# Patient Record
Sex: FEMALE | Race: BLACK OR AFRICAN AMERICAN | Employment: UNEMPLOYED | URBAN - METROPOLITAN AREA
[De-identification: names, ages, dates, MRNs, and addresses within clinical notes are randomized per-mention and may not be internally consistent; named-entity substitution may affect disease eponyms.]

---

## 2023-01-15 ENCOUNTER — APPOINTMENT (OUTPATIENT)
Dept: GENERAL RADIOLOGY | Facility: CLINIC | Age: 33
End: 2023-01-15
Payer: MEDICARE

## 2023-01-15 ENCOUNTER — HOSPITAL ENCOUNTER (EMERGENCY)
Facility: CLINIC | Age: 33
Discharge: HOME | End: 2023-01-15
Attending: EMERGENCY MEDICINE
Payer: MEDICARE

## 2023-01-15 VITALS
WEIGHT: 136 LBS | BODY MASS INDEX: 24.1 KG/M2 | TEMPERATURE: 97.9 F | HEART RATE: 60 BPM | RESPIRATION RATE: 17 BRPM | DIASTOLIC BLOOD PRESSURE: 68 MMHG | OXYGEN SATURATION: 100 % | HEIGHT: 63 IN | SYSTOLIC BLOOD PRESSURE: 116 MMHG

## 2023-01-15 DIAGNOSIS — M67.431 GANGLION CYST OF DORSUM OF RIGHT WRIST: Primary | ICD-10-CM

## 2023-01-15 PROCEDURE — 99284 EMERGENCY DEPT VISIT MOD MDM: CPT

## 2023-01-15 PROCEDURE — 6360000002 HC RX W HCPCS: Performed by: REGISTERED NURSE

## 2023-01-15 PROCEDURE — 73130 X-RAY EXAM OF HAND: CPT

## 2023-01-15 PROCEDURE — 96372 THER/PROPH/DIAG INJ SC/IM: CPT

## 2023-01-15 PROCEDURE — 73110 X-RAY EXAM OF WRIST: CPT

## 2023-01-15 RX ORDER — METHYLPREDNISOLONE 4 MG/1
TABLET ORAL
Qty: 1 KIT | Refills: 0 | Status: SHIPPED | OUTPATIENT
Start: 2023-01-15 | End: 2023-01-21

## 2023-01-15 RX ORDER — KETOROLAC TROMETHAMINE 30 MG/ML
30 INJECTION, SOLUTION INTRAMUSCULAR; INTRAVENOUS ONCE
Status: COMPLETED | OUTPATIENT
Start: 2023-01-15 | End: 2023-01-15

## 2023-01-15 RX ADMIN — KETOROLAC TROMETHAMINE 30 MG: 30 INJECTION, SOLUTION INTRAMUSCULAR; INTRAVENOUS at 14:55

## 2023-01-15 ASSESSMENT — PAIN - FUNCTIONAL ASSESSMENT: PAIN_FUNCTIONAL_ASSESSMENT: 0-10

## 2023-01-15 ASSESSMENT — PAIN SCALES - GENERAL: PAINLEVEL_OUTOF10: 10

## 2023-01-15 ASSESSMENT — ENCOUNTER SYMPTOMS
BACK PAIN: 0
COLOR CHANGE: 0

## 2023-01-15 NOTE — DISCHARGE INSTRUCTIONS
Please understand that at this time there is no evidence for a more serious underlying process, but that early in the process of an illness or injury, an emergency department workup can be falsely reassuring. You should contact your family doctor within the next 48 hours for a follow up appointment    [place]!!!    From [facility] and [facility] Emergency Services    On behalf of the Emergency Department staff at [facility], I would like to thank you for giving us the opportunity to address your health care needs and concerns. We hope that during your visit, our service was delivered in a professional and caring manner. Please keep [facility] in mind as we walk with you down the path to your own personal wellness. Please expect an automated text message or email from us so we can ask a few questions about your health and progress. Based on your answers, a clinician may call you back to offer help and instructions. Please understand that early in the process of an illness or injury, an emergency department workup can be falsely reassuring. If you notice any worsening, changing or persistent symptoms please call your family doctor or return to the ER immediately. Tell us how we did during your visit at [email or web address]   and let us know about your experience

## 2023-01-15 NOTE — ED PROVIDER NOTES
[facility]  eMERGENCY dEPARTMENT eNCOUnter   Independent Attestation     Pt Name: Karla Salas  MRN: [id number]  Birthdate 1990  Date of evaluation: 1/15/23       Karla Salas is a 32 y.o. female who presents with Hand Pain (Right hand pain w/ swelling x1 week. Pt states getting worse)        Based on the medical record, the care appears appropriate. I was personally available for consultation in the Emergency Department.    Sharlene Baez DO  Attending Emergency  Physician               Sharlene Baez DO  01/15/23 1517

## 2023-01-15 NOTE — ED PROVIDER NOTES
[facility]  [address] [facility]  Phone: [phone]        Pt Name: Giles Nguyen  MRN: [id number]  [place] 1990  Date of evaluation: 1/15/23    CHIEFCOMPLAINT       Chief Complaint   Patient presents with    Hand Pain     Right hand pain w/ swelling x1 week. Pt states getting worse       HISTORY OF PRESENT ILLNESS (Location/Symptom, Timing/Onset, Context/Setting, Quality, Duration, Modifying Factors, Severity)      Giles Nguyen is a 34 y.o. female with no pertinent PMH who presents to the ED via private auto with right hand and wrist pain ongoing for the last week. Patient denies any known injury but states that she has had increased swelling to the right wrist.  She denies any numbness or tingling, fevers or chills. He has not take any medications for symptoms. She states that nothing makes her symptoms better and that movement does make her symptoms worse. PAST MEDICAL / SURGICAL / SOCIAL / FAMILY HISTORY     PMH:  has no past medical history on file. Surgical History:  has no past surgical history on file. Social History:  reports that she has never smoked. She has never used smokeless tobacco. She reports current alcohol use. She reports that she does not use drugs. Family History: has no family status information on file. family history is not on file. Psychiatric History: None    Allergies: Patient has no known allergies. Home Medications:   Prior to Admission medications    Medication Sig Start Date End Date Taking? Authorizing Provider   methylPREDNISolone (MEDROL, MARISOL,) 4 MG tablet Take by mouth per instructions on packaging.  1/15/23 1/21/23 Yes ASHLEE Bowman - LASHAE   norethindrone-ethinyl estradiol (JUNEL FE 1/20) 1-20 MG-MCG per tablet Take 1 tablet by mouth daily    Historical Provider, MD   oxyCODONE-acetaminophen (PERCOCET) 5-325 MG per tablet Take 1-2 tablets by mouth every 6 hours as needed for Pain 10/9/16   Hina Guerra MD Multiple Vitamins-Minerals (THERAPEUTIC MULTIVITAMIN-MINERALS) tablet Take 1 tablet by mouth daily. Historical Provider, MD       REVIEW OF SYSTEMS  (2-9 systems for level 4, 10 ormore for level 5)      Review of Systems   Constitutional:  Negative for chills and fever. Musculoskeletal:  Positive for joint swelling. Negative for back pain, neck pain and neck stiffness. Positive right wrist/hand pain   Skin:  Negative for color change, pallor and wound. Neurological:  Negative for weakness and numbness. All other systems negative except as marked. PHYSICAL EXAM  (up to 7 for level 4, 8 or more for level 5)      INITIAL VITALS:  height is 5' 3\" (1.6 m) and weight is 61.7 kg (136 lb). Her temperature is 97.9 °F (36.6 °C). Her blood pressure is 116/68 and her pulse is 60. Her respiration is 17 and oxygen saturation is 100%. Vital signs reviewed. Physical Exam  Constitutional:       General: She is not in acute distress. Appearance: Normal appearance. She is not ill-appearing or toxic-appearing. HENT:      Head: Normocephalic and atraumatic. Neck:      Trachea: No tracheal deviation. Musculoskeletal:      Cervical back: Neck supple. Comments: Swelling noted to the dorsum of right wrist with a palpable mobile cystlike mass noted to dorsum of right wrist.  Patient does have full range of motion. No snuffbox tenderness. Patients is neurovascularly intact. Able to give OK sign, thumbs, up, and spread fingers against resistance bilaterally. Sensation intact to palmar aspect of index finger, webbing between fingers, and pinky finger bilaterally. Bilateral radial pulses 2+   Skin:     General: Skin is warm and dry. Capillary Refill: Capillary refill takes less than 2 seconds. Neurological:      Mental Status: She is alert.    Psychiatric:         Mood and Affect: Mood normal.         Behavior: Behavior normal.         DIFFERENTIAL DIAGNOSIS / MDM     Plan obtain x-ray right wrist and hand to rule out any bony abnormality associated patient swelling. We will provide patient with IM Toradol for pain control. X-ray right hand shows mild soft tissue swelling with no acute osseous abnormality. X-ray right wrist shows mild dorsal soft tissue swelling with no acute osseous abnormality, there is a few scattered subchondral cysts present. I plan to discharge patient home to follow-up with PCP within 1 day and will refer patient to hand surgery. Instructed patient take Tylenol ibuprofen as needed for pain and ice for the long time at a time. We will also provide patient with steroid pack. We will provide patient with wrist brace for comfort. Directed return with any worsening pain, swelling, numbness tingling or inability use arm. Patient is agreement to this plan this time. All question concerns were answered at this time. At this time the patient is without objective evidence of an acute process requiring hospitalization or inpatient management. They have remained hemodynamically stable throughout their entire ED visit and are stable for discharge with outpatient follow-up. The patient understands that at this time there is no evidence for a more malignant underlying process, but the patient also understands that early in the process of an illness or injury, an emergency department workup can be falsely reassuring. Routine discharge counseling was given, and the patient understands that worsening, changing or persistent symptoms should prompt an immediate call or follow up with their primary physician or return to the emergency department. The importance of appropriate follow up was also discussed. I have reviewed the disposition diagnosis with the patient and or their family/guardian. I have answered their questions and given discharge instructions. They voiced understanding of these instructions and did not have any further questions or complaints.       PLAN (LABS / Ash [facility]lvarezrra / EKG):  Orders Placed This Encounter   Procedures    XR HAND RIGHT (MIN 3 VIEWS)    XR WRIST RIGHT (MIN 3 VIEWS)    [employer] Wrist Brace, Right       MEDICATIONS ORDERED:  Orders Placed This Encounter   Medications    ketorolac (TORADOL) injection 30 mg    methylPREDNISolone (MEDROL, MARISOL,) 4 MG tablet     Sig: Take by mouth per instructions on packaging. Dispense:  1 kit     Refill:  0       Controlled Substances Monitoring:     DIAGNOSTIC RESULTS     EKG: All EKG's are interpreted by the Emergency Department Physician who either signs or Co-signs this chart in the absenceof a cardiologist.        RADIOLOGY: All images are read by the radiologist and their interpretations are reviewed. XR HAND RIGHT (MIN 3 VIEWS)   Final Result   Right hand: Mild soft tissue swelling. No acute osseous abnormality. Right wrist: Mild dorsal soft tissue swelling. No acute osseous abnormality. A few scattered subchondral cysts are present. XR WRIST RIGHT (MIN 3 VIEWS)   Final Result   Right hand: Mild soft tissue swelling. No acute osseous abnormality. Right wrist: Mild dorsal soft tissue swelling. No acute osseous abnormality. A few scattered subchondral cysts are present. No results found. LABS:  No results found for this visit on 01/15/23. EMERGENCY DEPARTMENT COURSE           Vitals:    Vitals:    01/15/23 1430   BP: 116/68   Pulse: 60   Resp: 17   Temp: 97.9 °F (36.6 °C)   SpO2: 100%   Weight: 61.7 kg (136 lb)   Height: 5' 3\" (1.6 m)     -------------------------  BP: 116/68, Temp: 97.9 °F (36.6 °C), Heart Rate: 60, Resp: 17      RE-EVALUATION:  See ED Course notes above. CONSULTS:  None    PROCEDURES:  None    FINAL IMPRESSION      1. Ganglion cyst of dorsum of right wrist          DISPOSITION / PLAN     CONDITION ON DISPOSITION:   Stable for discharge.      PATIENT REFERRED TO:  Jamila Liz [address] [facility]  [phone]    Call in 1 day      [facility]  [address] [facility] [address] [phone]    If symptoms worsen    Linda Orellana MD  [address] [facility]   [address] [facility] [address] [phone]    Call in 1 day  Ganglion cyst    DISCHARGE MEDICATIONS:  Discharge Medication List as of 1/15/2023  3:15 PM        START taking these medications    Details   methylPREDNISolone (MEDROL, MARISOL,) 4 MG tablet Take by mouth per instructions on packaging., Disp-1 kit, R-0Normal             ASHLEE Orellana - CNP   Emergency Medicine Nurse Practitioner    (Please note that portions of this note were completed with a voice recognition program.  Efforts were made to edit the dictations but occasionally words aremis-transcribed.)       ASHLEE Orellana CNP  01/15/23 8269

## 2023-01-23 ENCOUNTER — HOSPITAL ENCOUNTER (EMERGENCY)
Facility: CLINIC | Age: 33
Discharge: HOME | End: 2023-01-23
Attending: EMERGENCY MEDICINE
Payer: MEDICARE

## 2023-01-23 VITALS
OXYGEN SATURATION: 100 % | RESPIRATION RATE: 18 BRPM | SYSTOLIC BLOOD PRESSURE: 140 MMHG | HEIGHT: 63 IN | HEART RATE: 82 BPM | TEMPERATURE: 98.3 F | DIASTOLIC BLOOD PRESSURE: 99 MMHG | WEIGHT: 137 LBS | BODY MASS INDEX: 24.27 KG/M2

## 2023-01-23 DIAGNOSIS — Z98.82 HISTORY OF BREAST AUGMENTATION: ICD-10-CM

## 2023-01-23 DIAGNOSIS — N63.0 BREAST SWELLING: Primary | ICD-10-CM

## 2023-01-23 LAB
ABSOLUTE EOS #: 0.1 K/UL (ref 0–0.4)
ABSOLUTE LYMPH #: 1.6 K/UL (ref 1–4.8)
ABSOLUTE MONO #: 0.6 K/UL (ref 0.1–1.2)
BASOPHILS # BLD: 1 % (ref 0–2)
BASOPHILS ABSOLUTE: 0 K/UL (ref 0–0.2)
EOSINOPHILS RELATIVE PERCENT: 1 % (ref 1–4)
HCT VFR BLD CALC: 41.7 % (ref 36–46)
HEMOGLOBIN: 13.9 G/DL (ref 12–16)
LYMPHOCYTES # BLD: 26 % (ref 24–44)
MCH RBC QN AUTO: 30.8 PG (ref 26–34)
MCHC RBC AUTO-ENTMCNC: 33.3 G/DL (ref 31–37)
MCV RBC AUTO: 92.7 FL (ref 80–100)
MONOCYTES # BLD: 10 % (ref 2–11)
PDW BLD-RTO: 12 % (ref 12.5–15.4)
PLATELET # BLD: 310 K/UL (ref 140–450)
PMV BLD AUTO: 7.1 FL (ref 6–12)
RBC # BLD: 4.5 M/UL (ref 4–5.2)
SEG NEUTROPHILS: 62 % (ref 36–66)
SEGMENTED NEUTROPHILS ABSOLUTE COUNT: 3.9 K/UL (ref 1.8–7.7)
WBC # BLD: 6.3 K/UL (ref 3.5–11)

## 2023-01-23 PROCEDURE — 99283 EMERGENCY DEPT VISIT LOW MDM: CPT

## 2023-01-23 PROCEDURE — 85025 COMPLETE CBC W/AUTO DIFF WBC: CPT

## 2023-01-23 ASSESSMENT — PAIN - FUNCTIONAL ASSESSMENT: PAIN_FUNCTIONAL_ASSESSMENT: NONE - DENIES PAIN

## 2023-01-23 NOTE — DISCHARGE INSTRUCTIONS
MRI performed as soon as possible. Contact your plastic surgeon about following up. Return for worsening pain, swelling, redness, fever, drainage, or if worse in any way. Please understand that at this time there is no evidence for a more serious underlying process, but that early in the process of an illness or injury, an emergency department workup can be falsely reassuring. You should contact your family doctor within the next 48 hours for a follow up appointment    Cadendianejoaquin Marilee!!!    From [facility]) and [facility] Emergency Services    On behalf of the Emergency Department staff at [facility]), I would like to thank you for giving us the opportunity to address your health care needs and concerns. We hope that during your visit, our service was delivered in a professional and caring manner. Please keep [facility] ([facility]) in mind as we walk with you down the path to your own personal wellness. Please expect an automated text message or email from us so we can ask a few questions about your health and progress. Based on your answers, a clinician may call you back to offer help and instructions. Please understand that early in the process of an illness or injury, an emergency department workup can be falsely reassuring. If you notice any worsening, changing or persistent symptoms please call your family doctor or return to the ER immediately. Tell us how we did during your visit at [email or web address]   and let us know about your experience

## 2023-01-23 NOTE — ED PROVIDER NOTES
[address] ENCOUNTER      Pt Name: Neymar Duff  MRN: [id number]  [place] 1990  Date of evaluation: 2023      CHIEF COMPLAINT       Chief Complaint   Patient presents with    Breast Problem         HISTORY OF PRESENT ILLNESS      The patient presents with swelling of the right breast.  She had implants placed 6 months ago. She says that a week ago she started noticing swelling around the breast especially in the superior aspect. It is not tender. She has not had felt warmth. She has not had drainage. She has not had fever. Her surgery was performed in [place]. She has not seen anybody locally. REVIEW OF SYSTEMS       All systems reviewed and negative unless noted in HPI. The patient denies fever or constitutional symptoms. Denies chest pain or shortness of breath. No nausea,  vomiting or diarrhea. Denies musculoskeletal injury or pain. Denies any weakness, numbness or focal neurologic deficit. Swelling around right breast as noted in HPI. No recent psychiatric issues. No easy bruising or bleeding. Denies any polyuria, polydypsia or history of immunocompromise. PAST MEDICAL HISTORY    has a past medical history of Delivery with history of  and History of bilateral breast implants. SURGICAL HISTORY      has a past surgical history that includes fracture surgery. CURRENT MEDICATIONS       Previous Medications    MULTIPLE VITAMINS-MINERALS (THERAPEUTIC MULTIVITAMIN-MINERALS) TABLET    Take 1 tablet by mouth daily. NORETHINDRONE-ETHINYL ESTRADIOL (JUNEL FE 1/20) 1-20 MG-MCG PER TABLET    Take 1 tablet by mouth daily    OXYCODONE-ACETAMINOPHEN (PERCOCET) 5-325 MG PER TABLET    Take 1-2 tablets by mouth every 6 hours as needed for Pain       ALLERGIES     has No Known Allergies. FAMILY HISTORY     has no family status information on file. family history is not on file.     SOCIAL HISTORY reports that she has never smoked. She has never used smokeless tobacco. She reports current alcohol use. She reports that she does not use drugs. PHYSICAL EXAM     INITIAL VITALS:  height is 5' 3\" (1.6 m) and weight is 62.1 kg (137 lb). Her temperature is 98.3 °F (36.8 °C). Her blood pressure is 140/99 (abnormal) and her pulse is 82. Her respiration is 18 and oxygen saturation is 100%. The patient is alert and oriented, in no apparent distress. HEENT is atraumatic. Pupils are PERRL at 4 mm. Mucous membranes moist.    Neck is supple. Heart sounds regular rate and rhythm with no gallops, murmurs, or rubs. Lungs clear, no wheezes, rales or rhonchi. Abdomen: soft, nontender with no pain to palpation. Musculoskeletal exam shows no evidence of trauma. Normal distal pulses in all extremities. Skin: Examination of right breast demonstrates swelling especially superior to the breast up towards the top of the chest wall. No tenderness noted. No redness or warmth noted. No drainage from the nipple. Neurological exam reveals cranial nerves 2 through 12 grossly intact. Patient has equal  and normal deep tendon reflexes. Psychiatric: no hallucinations or suicidal ideation. Lymphatics.:  No lymphadenopathy.        DIFFERENTIAL DIAGNOSIS/ MDM:     Differential diagnosis considered: Infection, cellulitis, abscess, rupture of implant    Chronic Conditions affecting care (DM,HTN,CA, etc): None      Social Determinants of Health affecting care (unable to care for self, lives alone, unemployed, homeless,etc): None      History source(s) (patient,spouse,parent,family,friend,EMS,etc): Patient      Review of external sources (ECF,Hospital records,EMS report, radiology reports, etc): Previous hospital records      Tests considered but not ordered: MRI breast    Independent interpretation of tests (eg.  X-ray, CAT scan, Doppler studies, EKG): None      Discussion of x-ray results with radiology: Not applicable      Consults: see below    Consideration for admission/observation (even if discharged): Not applicable      Prescription considerations: None      Sepsis considered: Not applicable      Critical Care note written: Not applicable        DIAGNOSTIC RESULTS         RADIOLOGY:   I reviewed the radiologist interpretations:  MRI BREAST RIGHT W WO CONTRAST    (Results Pending)         LABS:  Results for orders placed or performed during the hospital encounter of 01/23/23   CBC with Auto Differential   Result Value Ref Range    WBC 6.3 3.5 - 11.0 k/uL    RBC 4.50 4.0 - 5.2 m/uL    Hemoglobin 13.9 12.0 - 16.0 g/dL    Hematocrit 41.7 36 - 46 %    MCV 92.7 80 - 100 fL    MCH 30.8 26 - 34 pg    MCHC 33.3 31 - 37 g/dL    RDW 12.0 (L) 12.5 - 15.4 %    Platelets 010 847 - 802 k/uL    MPV 7.1 6.0 - 12.0 fL    Seg Neutrophils 62 36 - 66 %    Lymphocytes 26 24 - 44 %    Monocytes 10 2 - 11 %    Eosinophils % 1 1 - 4 %    Basophils 1 0 - 2 %    Segs Absolute 3.90 1.8 - 7.7 k/uL    Absolute Lymph # 1.60 1.0 - 4.8 k/uL    Absolute Mono # 0.60 0.1 - 1.2 k/uL    Absolute Eos # 0.10 0.0 - 0.4 k/uL    Basophils Absolute 0.00 0.0 - 0.2 k/uL         EMERGENCY DEPARTMENT COURSE:   Vitals:    Vitals:    01/23/23 1130   BP: (!) 140/99   Pulse: 82   Resp: 18   Temp: 98.3 °F (36.8 °C)   SpO2: 100%   Weight: 62.1 kg (137 lb)   Height: 5' 3\" (1.6 m)     -------------------------  BP: (!) 140/99, Temp: 98.3 °F (36.8 °C), Heart Rate: 82, Resp: 18      Re-evaluation Notes    Unfortunately, the local plastic surgeon will not see the patient in consultation because they did not perform her surgery. I do not think the patient demonstrates evidence of infection at this time. She has no redness, warmth, drainage, or fever. Her white count is normal as well. The patient is encouraged to follow-up with her doctor. I have written for an MRI of the breast which is the test of choice to image and look for leaking from the implant.   The patient is advised to follow-up with her doctor closely. She should contact her surgeon and [place]. The patient is discharged in good condition. CONSULTS:    200  Dr. Dakota Qiu office contacted. Their policy is not to see other surgeons' patients. 1142  Discussed with Dr. Yocasta Swanson. Need MRI to see leak well. Can see seroma also. FINAL IMPRESSION      1. Breast swelling    2.  History of breast augmentation          DISPOSITION/PLAN   DISPOSITION Decision To Discharge 01/23/2023 12:24:01 PM      Condition on Disposition    good    PATIENT REFERRED TO:  [address]  [address] [facility]  [phone]    In 2 days      DISCHARGE MEDICATIONS:  New Prescriptions    No medications on file       (Please note that portions of this note were completed with a voice recognition program.  Efforts were made to edit the dictations but occasionally words are mis-transcribed.)    Marixa Simmons MD,, MD   Attending Emergency Physician       Juan Luis Carrizales MD  01/23/23 0965

## 2023-01-23 NOTE — LETTER
[facility]  [address] [facility]  Phone: [phone]               January 23, 2023    Patient: Osiel Nicole   YOB: 1990   Date of Visit: 1/23/2023       To Whom It May Concern:    Lona Wallace was seen and treated in our emergency department on 1/23/2023. She may return to work on 01/24/23.       Sincerely,       Sunil Gonzalez MD         Signature:__________________________________

## 2023-09-26 ENCOUNTER — HOSPITAL ENCOUNTER (EMERGENCY)
Facility: CLINIC | Age: 33
Discharge: HOME | End: 2023-09-26
Attending: EMERGENCY MEDICINE
Payer: MEDICAID

## 2023-09-26 VITALS
TEMPERATURE: 98.4 F | DIASTOLIC BLOOD PRESSURE: 80 MMHG | BODY MASS INDEX: 24.45 KG/M2 | HEART RATE: 62 BPM | SYSTOLIC BLOOD PRESSURE: 118 MMHG | HEIGHT: 63 IN | WEIGHT: 138 LBS | OXYGEN SATURATION: 100 % | RESPIRATION RATE: 16 BRPM

## 2023-09-26 DIAGNOSIS — T78.40XA ALLERGIC REACTION, INITIAL ENCOUNTER: Primary | ICD-10-CM

## 2023-09-26 PROCEDURE — 6360000002 HC RX W HCPCS: Performed by: NURSE PRACTITIONER

## 2023-09-26 PROCEDURE — A4216 STERILE WATER/SALINE, 10 ML: HCPCS | Performed by: NURSE PRACTITIONER

## 2023-09-26 PROCEDURE — 96374 THER/PROPH/DIAG INJ IV PUSH: CPT

## 2023-09-26 PROCEDURE — 96375 TX/PRO/DX INJ NEW DRUG ADDON: CPT

## 2023-09-26 PROCEDURE — 99284 EMERGENCY DEPT VISIT MOD MDM: CPT

## 2023-09-26 PROCEDURE — 2580000003 HC RX 258: Performed by: NURSE PRACTITIONER

## 2023-09-26 PROCEDURE — 2500000003 HC RX 250 WO HCPCS: Performed by: NURSE PRACTITIONER

## 2023-09-26 RX ORDER — FAMOTIDINE 20 MG/1
20 TABLET, FILM COATED ORAL 2 TIMES DAILY
Qty: 60 TABLET | Refills: 0 | Status: SHIPPED | OUTPATIENT
Start: 2023-09-26

## 2023-09-26 RX ORDER — CETIRIZINE HYDROCHLORIDE 10 MG/1
10 TABLET ORAL DAILY
Qty: 30 TABLET | Refills: 0 | Status: SHIPPED | OUTPATIENT
Start: 2023-09-26 | End: 2023-10-26

## 2023-09-26 RX ORDER — PREDNISONE 50 MG/1
50 TABLET ORAL DAILY
Qty: 5 TABLET | Refills: 0 | Status: SHIPPED | OUTPATIENT
Start: 2023-09-26 | End: 2023-10-01

## 2023-09-26 RX ADMIN — FAMOTIDINE 20 MG: 10 INJECTION, SOLUTION INTRAVENOUS at 12:06

## 2023-09-26 RX ADMIN — METHYLPREDNISOLONE SODIUM SUCCINATE 125 MG: 125 INJECTION, POWDER, FOR SOLUTION INTRAMUSCULAR; INTRAVENOUS at 12:05

## 2023-09-26 NOTE — DISCHARGE INSTRUCTIONS
Do not use eyebrow tint again due to the allergic reaction.     Take Pepcid twice daily as directed    Take Zyrtec daily as directed    Take steroid daily as directed-please start this tomorrow as you were already given a dose today    Cool compresses to affected eyebrow area    If symptoms worsen, you develop any difficulty breathing, throat tightening, sore throat or any new or concerning symptoms arise please return to the emergency department immediately